# Patient Record
Sex: FEMALE
[De-identification: names, ages, dates, MRNs, and addresses within clinical notes are randomized per-mention and may not be internally consistent; named-entity substitution may affect disease eponyms.]

---

## 2022-07-08 ENCOUNTER — NON-APPOINTMENT (OUTPATIENT)
Age: 52
End: 2022-07-08

## 2022-07-08 ENCOUNTER — APPOINTMENT (OUTPATIENT)
Dept: INTERNAL MEDICINE | Facility: CLINIC | Age: 52
End: 2022-07-08

## 2022-07-08 VITALS
OXYGEN SATURATION: 99 % | WEIGHT: 188 LBS | BODY MASS INDEX: 32.1 KG/M2 | DIASTOLIC BLOOD PRESSURE: 63 MMHG | HEART RATE: 70 BPM | HEIGHT: 64 IN | SYSTOLIC BLOOD PRESSURE: 113 MMHG | TEMPERATURE: 97.3 F

## 2022-07-08 DIAGNOSIS — Z01.818 ENCOUNTER FOR OTHER PREPROCEDURAL EXAMINATION: ICD-10-CM

## 2022-07-08 DIAGNOSIS — Z00.00 ENCOUNTER FOR GENERAL ADULT MEDICAL EXAMINATION W/OUT ABNORMAL FINDINGS: ICD-10-CM

## 2022-07-08 DIAGNOSIS — R91.1 SOLITARY PULMONARY NODULE: ICD-10-CM

## 2022-07-08 PROCEDURE — 99386 PREV VISIT NEW AGE 40-64: CPT | Mod: 25

## 2022-07-08 PROCEDURE — 36415 COLL VENOUS BLD VENIPUNCTURE: CPT

## 2022-07-08 PROCEDURE — G0444 DEPRESSION SCREEN ANNUAL: CPT | Mod: 59

## 2022-07-08 PROCEDURE — 93000 ELECTROCARDIOGRAM COMPLETE: CPT | Mod: 59

## 2022-07-08 NOTE — ASSESSMENT
[FreeTextEntry1] : annual exam- well adult\par screening and labs ordered\par further recs pending labs\par \par \par preop\par - follow up labs \par -EKG-\par vitals stable\par \par

## 2022-07-08 NOTE — HEALTH RISK ASSESSMENT
[Good] : ~his/her~ current health as good [Very Good] : ~his/her~  mood as very good [Former] : Former [20 or more] : 20 or more [Yes] : Yes [No] : In the past 12 months have you used drugs other than those required for medical reasons? No [No falls in past year] : Patient reported no falls in the past year [0] : 2) Feeling down, depressed, or hopeless: Not at all (0) [Patient reported mammogram was normal] : Patient reported mammogram was normal [Patient reported PAP Smear was normal] : Patient reported PAP Smear was normal [HIV test declined] : HIV test declined [Hepatitis C test declined] : Hepatitis C test declined [With Significant Other] : lives with significant other [With Family] : lives with family [Employed] : employed [Significant Other] : lives with significant other [Feels Safe at Home] : Feels safe at home [Fully functional (bathing, dressing, toileting, transferring, walking, feeding)] : Fully functional (bathing, dressing, toileting, transferring, walking, feeding) [Reports normal functional visual acuity (ie: able to read med bottle)] : Reports normal functional visual acuity [Smoke Detector] : smoke detector [Carbon Monoxide Detector] : carbon monoxide detector [Safety elements used in home] : safety elements used in home [Seat Belt] :  uses seat belt [Sunscreen] : uses sunscreen [PHQ-2 Negative - No further assessment needed] : PHQ-2 Negative - No further assessment needed [# Of Children ___] : has [unfilled] children [Audit-CScore] : 0 [de-identified] : walking, running [de-identified] : healthy [[id number]] : 0 [Change in mental status noted] : No change in mental status noted [Reports changes in hearing] : Reports no changes in hearing [Reports changes in vision] : Reports no changes in vision [Reports changes in dental health] : Reports no changes in dental health [MammogramComments] : july 2021 [PapSmearComments] : july 2021

## 2022-07-08 NOTE — HISTORY OF PRESENT ILLNESS
[FreeTextEntry1] : needs pcp, surgical clearance [de-identified] : Annual exam and preop for tummy ryannck\par \par PMHx- no sig\par \par lost 70 pounds over 13 months with diet and exercise and now needs tummy tuck to get rid of excess skin\par \par no acute complaints at this time\par \par

## 2022-07-11 PROBLEM — R91.1 NODULE OF LEFT LUNG: Status: ACTIVE | Noted: 2022-07-11

## 2022-07-11 LAB
25(OH)D3 SERPL-MCNC: 23 NG/ML
ALBUMIN SERPL ELPH-MCNC: 4.2 G/DL
ALP BLD-CCNC: 74 U/L
ALT SERPL-CCNC: 12 U/L
ANION GAP SERPL CALC-SCNC: 8 MMOL/L
APTT BLD: 31.8 SEC
AST SERPL-CCNC: 18 U/L
BASOPHILS # BLD AUTO: 0.06 K/UL
BASOPHILS NFR BLD AUTO: 0.9 %
BILIRUB SERPL-MCNC: 0.3 MG/DL
BUN SERPL-MCNC: 14 MG/DL
CALCIUM SERPL-MCNC: 9.3 MG/DL
CHLORIDE SERPL-SCNC: 105 MMOL/L
CHOLEST SERPL-MCNC: 188 MG/DL
CO2 SERPL-SCNC: 26 MMOL/L
CREAT SERPL-MCNC: 0.81 MG/DL
EGFR: 88 ML/MIN/1.73M2
EOSINOPHIL # BLD AUTO: 0.13 K/UL
EOSINOPHIL NFR BLD AUTO: 1.9 %
ESTIMATED AVERAGE GLUCOSE: 108 MG/DL
GLUCOSE SERPL-MCNC: 89 MG/DL
HBA1C MFR BLD HPLC: 5.4 %
HCT VFR BLD CALC: 42.6 %
HDLC SERPL-MCNC: 67 MG/DL
HGB BLD-MCNC: 13 G/DL
IMM GRANULOCYTES NFR BLD AUTO: 0.3 %
INR PPP: 0.94 RATIO
IRON SATN MFR SERPL: 24 %
IRON SERPL-MCNC: 73 UG/DL
LDLC SERPL CALC-MCNC: 101 MG/DL
LYMPHOCYTES # BLD AUTO: 1.46 K/UL
LYMPHOCYTES NFR BLD AUTO: 21.4 %
MAN DIFF?: NORMAL
MCHC RBC-ENTMCNC: 29.4 PG
MCHC RBC-ENTMCNC: 30.5 GM/DL
MCV RBC AUTO: 96.4 FL
MONOCYTES # BLD AUTO: 0.63 K/UL
MONOCYTES NFR BLD AUTO: 9.2 %
NEUTROPHILS # BLD AUTO: 4.53 K/UL
NEUTROPHILS NFR BLD AUTO: 66.3 %
NONHDLC SERPL-MCNC: 121 MG/DL
PLATELET # BLD AUTO: 309 K/UL
POTASSIUM SERPL-SCNC: 4.9 MMOL/L
PROT SERPL-MCNC: 7.2 G/DL
PT BLD: 10.9 SEC
RBC # BLD: 4.42 M/UL
RBC # FLD: 14.6 %
SODIUM SERPL-SCNC: 138 MMOL/L
TIBC SERPL-MCNC: 310 UG/DL
TRIGL SERPL-MCNC: 100 MG/DL
TSH SERPL-ACNC: 2.12 UIU/ML
UIBC SERPL-MCNC: 237 UG/DL
WBC # FLD AUTO: 6.83 K/UL

## 2023-07-28 ENCOUNTER — APPOINTMENT (OUTPATIENT)
Dept: INTERNAL MEDICINE | Facility: CLINIC | Age: 53
End: 2023-07-28
Payer: COMMERCIAL

## 2023-07-28 VITALS
HEART RATE: 92 BPM | DIASTOLIC BLOOD PRESSURE: 83 MMHG | HEIGHT: 64 IN | SYSTOLIC BLOOD PRESSURE: 121 MMHG | OXYGEN SATURATION: 98 % | WEIGHT: 170 LBS | TEMPERATURE: 97.9 F | BODY MASS INDEX: 29.02 KG/M2

## 2023-07-28 DIAGNOSIS — Z12.39 ENCOUNTER FOR OTHER SCREENING FOR MALIGNANT NEOPLASM OF BREAST: ICD-10-CM

## 2023-07-28 PROCEDURE — 99213 OFFICE O/P EST LOW 20 MIN: CPT

## 2023-07-28 NOTE — HISTORY OF PRESENT ILLNESS
[FreeTextEntry8] : The patient is a 52 year old F who presents to the office for the following concerns:\par \par THe patient endorses urinary frequency, hematuria and dysuria x 1 day. She does not have UTIs often. \par She notes the onset of menopause \par No fever or chills

## 2023-07-28 NOTE — REVIEW OF SYSTEMS
[Dysuria] : dysuria [Incontinence] : no incontinence [Hematuria] : hematuria [Frequency] : frequency [Vaginal Discharge] : no vaginal discharge [Negative] : Gastrointestinal

## 2023-08-02 LAB
APPEARANCE: CLEAR
BACTERIA: NEGATIVE /HPF
BILIRUBIN URINE: NEGATIVE
BLOOD URINE: ABNORMAL
CAST: 0 /LPF
COLOR: ABNORMAL
EPITHELIAL CELLS: 0 /HPF
GLUCOSE QUALITATIVE U: NEGATIVE MG/DL
KETONES URINE: NEGATIVE MG/DL
LEUKOCYTE ESTERASE URINE: ABNORMAL
MICROSCOPIC-UA: NORMAL
NITRITE URINE: NEGATIVE
PH URINE: 6
PROTEIN URINE: 30 MG/DL
RED BLOOD CELLS URINE: 1 /HPF
REVIEW: NORMAL
SPECIFIC GRAVITY URINE: 1
UROBILINOGEN URINE: 0.2 MG/DL
WHITE BLOOD CELLS URINE: 80 /HPF

## 2023-08-08 LAB — BACTERIA UR CULT: ABNORMAL

## 2023-08-21 ENCOUNTER — APPOINTMENT (OUTPATIENT)
Dept: INTERNAL MEDICINE | Facility: CLINIC | Age: 53
End: 2023-08-21
Payer: COMMERCIAL

## 2023-08-21 VITALS
TEMPERATURE: 97.9 F | DIASTOLIC BLOOD PRESSURE: 64 MMHG | WEIGHT: 180 LBS | HEART RATE: 66 BPM | HEIGHT: 64 IN | SYSTOLIC BLOOD PRESSURE: 110 MMHG | OXYGEN SATURATION: 98 % | BODY MASS INDEX: 30.73 KG/M2

## 2023-08-21 DIAGNOSIS — T78.2XXA ANAPHYLACTIC SHOCK, UNSPECIFIED, INITIAL ENCOUNTER: ICD-10-CM

## 2023-08-21 DIAGNOSIS — R30.0 DYSURIA: ICD-10-CM

## 2023-08-21 DIAGNOSIS — R31.9 HEMATURIA, UNSPECIFIED: ICD-10-CM

## 2023-08-21 PROCEDURE — 99214 OFFICE O/P EST MOD 30 MIN: CPT

## 2023-08-21 RX ORDER — NITROFURANTOIN (MONOHYDRATE/MACROCRYSTALS) 25; 75 MG/1; MG/1
100 CAPSULE ORAL
Qty: 10 | Refills: 0 | Status: DISCONTINUED | COMMUNITY
Start: 2023-07-28 | End: 2023-08-21

## 2023-08-21 RX ORDER — EPINEPHRINE 0.3 MG/.3ML
0.3 INJECTION INTRAMUSCULAR
Qty: 1 | Refills: 1 | Status: ACTIVE | COMMUNITY
Start: 2022-04-27

## 2023-08-21 RX ORDER — AMOXICILLIN AND CLAVULANATE POTASSIUM 500; 125 MG/1; MG/1
500-125 TABLET, FILM COATED ORAL
Qty: 14 | Refills: 0 | Status: ACTIVE | COMMUNITY
Start: 2023-08-21 | End: 1900-01-01

## 2023-08-21 NOTE — HISTORY OF PRESENT ILLNESS
[FreeTextEntry8] : The patient is a 52 year old F who presents to the office for the following concerns:  The patient presents with recurrent hematuria, dysuria, and urinary frequency x 4 days. She was recently treated for UTI on 7/28 (3 weeks ago).  No fever or chills Endorses mild low back pain a few days ago.

## 2023-08-21 NOTE — REVIEW OF SYSTEMS
[Dysuria] : dysuria [Hematuria] : hematuria [Frequency] : frequency [Vaginal Discharge] : no vaginal discharge [Negative] : Gastrointestinal

## 2023-08-21 NOTE — PHYSICAL EXAM
[Normal] : no acute distress, well nourished, well developed and well-appearing [Soft] : abdomen soft [Non Tender] : non-tender [No CVA Tenderness] : no CVA  tenderness

## 2023-08-22 LAB
APPEARANCE: CLEAR
BACTERIA: ABNORMAL /HPF
BILIRUBIN URINE: NEGATIVE
BLOOD URINE: ABNORMAL
CAST: 1 /LPF
COLOR: YELLOW
EPITHELIAL CELLS: 2 /HPF
GLUCOSE QUALITATIVE U: NEGATIVE MG/DL
KETONES URINE: NEGATIVE MG/DL
LEUKOCYTE ESTERASE URINE: ABNORMAL
MICROSCOPIC-UA: NORMAL
NITRITE URINE: NEGATIVE
PH URINE: 7
PROTEIN URINE: NEGATIVE MG/DL
RED BLOOD CELLS URINE: 0 /HPF
REVIEW: NORMAL
SPECIFIC GRAVITY URINE: 1
UROBILINOGEN URINE: 0.2 MG/DL
WHITE BLOOD CELLS URINE: 29 /HPF

## 2023-08-24 LAB — BACTERIA UR CULT: NORMAL

## 2024-11-06 ENCOUNTER — LABORATORY RESULT (OUTPATIENT)
Age: 54
End: 2024-11-06

## 2024-11-06 ENCOUNTER — APPOINTMENT (OUTPATIENT)
Dept: INTERNAL MEDICINE | Facility: CLINIC | Age: 54
End: 2024-11-06
Payer: COMMERCIAL

## 2024-11-06 VITALS
OXYGEN SATURATION: 97 % | TEMPERATURE: 96.7 F | SYSTOLIC BLOOD PRESSURE: 122 MMHG | HEART RATE: 101 BPM | HEIGHT: 64 IN | DIASTOLIC BLOOD PRESSURE: 77 MMHG

## 2024-11-06 DIAGNOSIS — Z13.228 ENCOUNTER FOR SCREENING FOR OTHER METABOLIC DISORDERS: ICD-10-CM

## 2024-11-06 DIAGNOSIS — R30.0 DYSURIA: ICD-10-CM

## 2024-11-06 PROCEDURE — 99214 OFFICE O/P EST MOD 30 MIN: CPT

## 2024-11-06 PROCEDURE — G2211 COMPLEX E/M VISIT ADD ON: CPT | Mod: NC

## 2024-11-06 PROCEDURE — 36415 COLL VENOUS BLD VENIPUNCTURE: CPT

## 2024-11-08 LAB
APPEARANCE: CLEAR
BILIRUBIN URINE: NEGATIVE
BLOOD URINE: NEGATIVE
COLOR: YELLOW
GLUCOSE QUALITATIVE U: NEGATIVE MG/DL
KETONES URINE: NEGATIVE MG/DL
LEUKOCYTE ESTERASE URINE: ABNORMAL
NITRITE URINE: NEGATIVE
PH URINE: 6
PROTEIN URINE: NEGATIVE MG/DL
SPECIFIC GRAVITY URINE: 1
UROBILINOGEN URINE: 0.2 MG/DL

## 2024-11-14 LAB
ALBUMIN SERPL ELPH-MCNC: 4.4 G/DL
ALP BLD-CCNC: 86 U/L
ALT SERPL-CCNC: 14 U/L
ANION GAP SERPL CALC-SCNC: 13 MMOL/L
AST SERPL-CCNC: 22 U/L
BASOPHILS # BLD AUTO: 0.06 K/UL
BASOPHILS NFR BLD AUTO: 0.9 %
BILIRUB SERPL-MCNC: 0.2 MG/DL
BUN SERPL-MCNC: 18 MG/DL
CALCIUM SERPL-MCNC: 9.8 MG/DL
CHLORIDE SERPL-SCNC: 103 MMOL/L
CHOLEST SERPL-MCNC: 203 MG/DL
CO2 SERPL-SCNC: 23 MMOL/L
CREAT SERPL-MCNC: 0.8 MG/DL
EGFR: 88 ML/MIN/1.73M2
EOSINOPHIL # BLD AUTO: 0.27 K/UL
EOSINOPHIL NFR BLD AUTO: 3.9 %
ESTIMATED AVERAGE GLUCOSE: 117 MG/DL
FOLATE SERPL-MCNC: 6.8 NG/ML
GLUCOSE SERPL-MCNC: 103 MG/DL
HBA1C MFR BLD HPLC: 5.7 %
HCT VFR BLD CALC: 43.7 %
HDLC SERPL-MCNC: 70 MG/DL
HGB BLD-MCNC: 13.4 G/DL
IMM GRANULOCYTES NFR BLD AUTO: 0.3 %
LDLC SERPL CALC-MCNC: 105 MG/DL
LYMPHOCYTES # BLD AUTO: 1.57 K/UL
LYMPHOCYTES NFR BLD AUTO: 22.7 %
MAN DIFF?: NORMAL
MCHC RBC-ENTMCNC: 29.3 PG
MCHC RBC-ENTMCNC: 30.7 G/DL
MCV RBC AUTO: 95.6 FL
MONOCYTES # BLD AUTO: 0.65 K/UL
MONOCYTES NFR BLD AUTO: 9.4 %
NEUTROPHILS # BLD AUTO: 4.36 K/UL
NEUTROPHILS NFR BLD AUTO: 62.8 %
NONHDLC SERPL-MCNC: 132 MG/DL
PLATELET # BLD AUTO: 294 K/UL
POTASSIUM SERPL-SCNC: 4.6 MMOL/L
PROT SERPL-MCNC: 7.4 G/DL
RBC # BLD: 4.57 M/UL
RBC # FLD: 14.1 %
SODIUM SERPL-SCNC: 139 MMOL/L
TRIGL SERPL-MCNC: 158 MG/DL
TSH SERPL-ACNC: 3.33 UIU/ML
VIT B12 SERPL-MCNC: 406 PG/ML
WBC # FLD AUTO: 6.93 K/UL